# Patient Record
Sex: FEMALE | Race: WHITE
[De-identification: names, ages, dates, MRNs, and addresses within clinical notes are randomized per-mention and may not be internally consistent; named-entity substitution may affect disease eponyms.]

---

## 2019-02-15 ENCOUNTER — HOSPITAL ENCOUNTER (OUTPATIENT)
Dept: HOSPITAL 53 - M RAD | Age: 39
End: 2019-02-15
Attending: NURSE PRACTITIONER
Payer: COMMERCIAL

## 2019-02-15 DIAGNOSIS — M25.561: Primary | ICD-10-CM

## 2019-02-15 DIAGNOSIS — D16.21: ICD-10-CM

## 2019-02-15 DIAGNOSIS — M25.461: ICD-10-CM

## 2019-02-15 DIAGNOSIS — M22.41: ICD-10-CM

## 2019-02-15 PROCEDURE — 73723 MRI JOINT LWR EXTR W/O&W/DYE: CPT

## 2019-02-15 NOTE — REP
MRI RIGHT KNEE WITH AND WITHOUT CONTRAST:

 

TECHNIQUE:  Axial proton density fat saturation, sagittal proton density T2 STIR,

water excitation, coronal proton density, proton density fat saturation. Axial T1

fat sat, post IV gadolinium axial and coronal T1 fat sat with the intravenous

administration of 15 mL ProHance.

 

The menisci show no evidence of a tear. The cruciate and collateral ligaments are

intact. The extensor mechanism is intact. There is moderate chondromalacia along

the lateral patellar facet with some minimal subchondral marrow edema. The

opposing anterior aspect of the lateral femoral condyle demonstrates a chondral

defect down to the bone, which has a diameter of about 7 mm. Otherwise, there is

mild global chondromalacia of the femoral condyles and tibial plateaus as well as

the medial patellar facet. There is no bone marrow edema or occult fracture. A

central bone lesion is seen in the distal femur. It has microlobulated borders.

It measures 2.0 x 1.4 x 1.1 cm. It is hypointense on T1 and hyperintense on T2.

There is rim enhancement with a more globular area of enhancement in the inferior

aspect. The location and appearance is most consistent with an enchondroma. There

is a small joint effusion. There is mild complex fluid in the medial popliteal

fossa between the semimembranous and medial head of gastrocnemius muscle. The

fluid is mildly septated. It extends for a length between 6 cm and 7 cm with a

maximum thickness of 1.1 cm. Suprapatellar plica is noted.

 

IMPRESSION:

 

No evidence of meniscal tear. Cruciate and collateral ligaments intact. Moderate

chondromalacia of the lateral patellar facet. There is a 7 mm chondral defect

onto the bone in the opposing anterior lateral femoral condyle. Otherwise, there

is mild global chondromalacia. Bone lesion in the distal femur centrally is most

consistent with an enchondroma, as discussed above. There is a mild joint

effusion. There is mild complex fluid in the medial popliteal fossa between the

semimembranosus and medial head of the gastrocnemius.

 

 

Electronically Signed by

Perfecto Gordillo MD 02/15/2019 04:13 P

## 2019-03-12 ENCOUNTER — HOSPITAL ENCOUNTER (OUTPATIENT)
Dept: HOSPITAL 53 - M LABDRAW1 | Age: 39
End: 2019-03-12
Attending: PHYSICAL MEDICINE & REHABILITATION
Payer: COMMERCIAL

## 2019-03-12 DIAGNOSIS — M47.816: Primary | ICD-10-CM

## 2019-03-12 LAB
CRP SERPL-MCNC: < 0.3 MG/DL (ref 0–0.3)
RHEUMATOID FACT SERPL-ACNC: < 10 IU/ML (ref ?–15)
URATE SERPL-MCNC: 3.2 MG/DL (ref 2.6–6)

## 2019-11-21 ENCOUNTER — HOSPITAL ENCOUNTER (OUTPATIENT)
Dept: HOSPITAL 53 - M SDC | Age: 39
Discharge: HOME | End: 2019-11-21
Attending: UROLOGY
Payer: COMMERCIAL

## 2019-11-21 VITALS — DIASTOLIC BLOOD PRESSURE: 69 MMHG | SYSTOLIC BLOOD PRESSURE: 109 MMHG

## 2019-11-21 VITALS — WEIGHT: 156 LBS | BODY MASS INDEX: 24.48 KG/M2 | HEIGHT: 67 IN

## 2019-11-21 DIAGNOSIS — L90.5: Primary | ICD-10-CM

## 2019-11-21 DIAGNOSIS — N94.10: ICD-10-CM

## 2019-11-21 PROCEDURE — 81025 URINE PREGNANCY TEST: CPT

## 2019-11-21 PROCEDURE — 88302 TISSUE EXAM BY PATHOLOGIST: CPT

## 2019-11-21 PROCEDURE — 56620 VULVECTOMY SIMPLE PARTIAL: CPT

## 2019-11-23 NOTE — RO
DATE OF PROCEDURE:  11/21/2019

 

PREPROCEDURE DIAGNOSIS:  Painful scar and dyspareunia.

 

POSTPROCEDURE DIAGNOSIS:  Painful scar and dyspareunia.

 

PROCEDURE:  Left labioplasty with removal of scar and correction of labial

defect.

 

SURGEON:  Dr. Lizy Mejía.

 

ASSISTANT:

 

ANESTHESIA:  Laryngeal mask anesthesia (LMA).

 

DESCRIPTION OF PROCEDURE:  Janie was brought to the operating room where

sufficient LMA anesthesia was induced and she was prepped, draped and positioned

in the usual sterile fashion and the bladder was emptied.  We then examined her

labial scar.  There was a gap in the left labia where they had detached very far

anteriorly, really adjacent to the clitoral hutchinson.  There was a U-shaped defect.

Then the remaining labia had,  of course, elongated and become very troublesome

for the patient , who had to kind of hold them aside and over that U-shaped spot

in order to have intercourse because there was too much sensitivity at the UC

spot plus the attachment seemed to have made some aspects of intercourse less

pleasant for her sensory-wise as well so not just pain.   All this had been

discussed with the patient previously.  We removed the U-shaped tissue scar and

then re-evaluated the tissues.  She is aware that it would be my preference not

to over-correct and certainly not to just remove that labral tissue, especially

since the bottom part of the U is what was tender and painful for her.  So we

went ahead and removed that and then did deep stitches to draw that posterior

aspect up together so that the skin would not be under any tension.  We used #4-0

Vicryl for this and we had plenty of tissue within the labia to work with.  When

we did that, ew found that not surprisingly, even though some of the length of

that pendant tissue was lost by transferring it anteriorly, it was still taller

than the other tissue remaining.  So we reapproximated sort of in a J with that

deep re-approximation on the medial aspect doing a subcu and interrupted stitch

#4-0 Vicryl reapproximation and then externally doing some interrupted and

running #4-0 Vicryl reapproximation and that tissue had a little bit of a J to it

after the correction because of the curve taken by the tissue as it was brought

back forward.  But again the skin sat together without tension and then we

resected again at the apex to bring that tissue down to the same level so there

would be a smooth transition from the anterior to the posterior aspect of this so

as to minimize the likelihood of recurrence of this problem.  The patient is

aware that she is going to have to avoid friction, etc., until this has healed.

We did not have tissue that I felt we could glue.  We did consider that but she

is pre-menopausal and I did not feel that Dermabond or other dressings such as

that would improve healing.  So we did very, very carefully clean her off without

disrupting the stitches.  We used #4-0 to close all the skin wounds and then

having cleaned her off, the procedure was ended.

 

ESTIMATED BLOOD LOSS:  About 10 mL.

 

FLUID REPLACEMENT:  Crystalloid.

 

COMPLICATIONS:  None.

 

CONDITION AND DISPOSITION:  Janie tolerated the procedure well and was recovering

in the recovery room in good condition.